# Patient Record
Sex: FEMALE | Race: OTHER | Employment: FULL TIME | URBAN - METROPOLITAN AREA
[De-identification: names, ages, dates, MRNs, and addresses within clinical notes are randomized per-mention and may not be internally consistent; named-entity substitution may affect disease eponyms.]

---

## 2022-10-08 ENCOUNTER — HOSPITAL ENCOUNTER (OUTPATIENT)
Age: 22
Discharge: HOME | End: 2022-10-08
Payer: MEDICAID

## 2022-10-08 VITALS
RESPIRATION RATE: 18 BRPM | HEIGHT: 59 IN | TEMPERATURE: 99 F | SYSTOLIC BLOOD PRESSURE: 126 MMHG | HEART RATE: 94 BPM | WEIGHT: 130 LBS | DIASTOLIC BLOOD PRESSURE: 67 MMHG | OXYGEN SATURATION: 98 % | BODY MASS INDEX: 26.21 KG/M2

## 2022-10-08 DIAGNOSIS — J01.90 ACUTE NON-RECURRENT SINUSITIS, UNSPECIFIED LOCATION: Primary | ICD-10-CM

## 2022-10-08 LAB
S PYO AG THROAT QL: NEGATIVE
SARS-COV-2 RNA RESP QL NAA+PROBE: NOT DETECTED

## 2022-10-08 PROCEDURE — 99204 OFFICE O/P NEW MOD 45 MIN: CPT

## 2022-10-08 PROCEDURE — 87880 STREP A ASSAY W/OPTIC: CPT

## 2022-10-08 PROCEDURE — 99203 OFFICE O/P NEW LOW 30 MIN: CPT

## 2022-10-08 RX ORDER — AMOXICILLIN AND CLAVULANATE POTASSIUM 875; 125 MG/1; MG/1
1 TABLET, FILM COATED ORAL 2 TIMES DAILY
Qty: 20 TABLET | Refills: 0 | Status: SHIPPED | OUTPATIENT
Start: 2022-10-08 | End: 2022-10-18

## 2022-10-08 NOTE — ED INITIAL ASSESSMENT (HPI)
cough, runny nose with green secretions, constant headache, started last Sunday. Low grade temps today.

## 2023-06-08 ENCOUNTER — HOSPITAL ENCOUNTER (OUTPATIENT)
Age: 23
Discharge: HOME | End: 2023-06-08
Payer: MEDICAID

## 2023-06-08 VITALS
OXYGEN SATURATION: 97 % | DIASTOLIC BLOOD PRESSURE: 80 MMHG | HEART RATE: 90 BPM | SYSTOLIC BLOOD PRESSURE: 119 MMHG | BODY MASS INDEX: 27 KG/M2 | TEMPERATURE: 99 F | RESPIRATION RATE: 15 BRPM | WEIGHT: 135 LBS

## 2023-06-08 DIAGNOSIS — R10.2 PELVIC CRAMPING: Primary | ICD-10-CM

## 2023-06-08 DIAGNOSIS — N92.6 MISSED MENSES: ICD-10-CM

## 2023-06-08 LAB
B-HCG SERPL-ACNC: <1 MIU/ML
B-HCG UR QL: NEGATIVE
POCT BILIRUBIN URINE: NEGATIVE
POCT GLUCOSE URINE: NEGATIVE MG/DL
POCT KETONE URINE: NEGATIVE MG/DL
POCT LEUKOCYTE ESTERASE URINE: NEGATIVE
POCT NITRITE URINE: NEGATIVE
POCT PH URINE: 6 (ref 5–8)
POCT PROTEIN URINE: NEGATIVE MG/DL
POCT SPECIFIC GRAVITY URINE: 1.02
POCT URINE CLARITY: CLEAR
POCT URINE COLOR: YELLOW
POCT UROBILINOGEN URINE: 0.2 MG/DL

## 2023-06-08 PROCEDURE — 99214 OFFICE O/P EST MOD 30 MIN: CPT

## 2023-06-08 PROCEDURE — 87591 N.GONORRHOEAE DNA AMP PROB: CPT | Performed by: PHYSICIAN ASSISTANT

## 2023-06-08 PROCEDURE — 87480 CANDIDA DNA DIR PROBE: CPT | Performed by: PHYSICIAN ASSISTANT

## 2023-06-08 PROCEDURE — 84702 CHORIONIC GONADOTROPIN TEST: CPT | Performed by: PHYSICIAN ASSISTANT

## 2023-06-08 PROCEDURE — 87086 URINE CULTURE/COLONY COUNT: CPT | Performed by: PHYSICIAN ASSISTANT

## 2023-06-08 PROCEDURE — 87077 CULTURE AEROBIC IDENTIFY: CPT | Performed by: PHYSICIAN ASSISTANT

## 2023-06-08 PROCEDURE — 36415 COLL VENOUS BLD VENIPUNCTURE: CPT

## 2023-06-08 PROCEDURE — 81025 URINE PREGNANCY TEST: CPT

## 2023-06-08 PROCEDURE — 87491 CHLMYD TRACH DNA AMP PROBE: CPT | Performed by: PHYSICIAN ASSISTANT

## 2023-06-08 PROCEDURE — 87510 GARDNER VAG DNA DIR PROBE: CPT | Performed by: PHYSICIAN ASSISTANT

## 2023-06-08 PROCEDURE — 81002 URINALYSIS NONAUTO W/O SCOPE: CPT | Performed by: EMERGENCY MEDICINE

## 2023-06-08 PROCEDURE — 87660 TRICHOMONAS VAGIN DIR PROBE: CPT | Performed by: PHYSICIAN ASSISTANT

## 2023-06-08 NOTE — ED INITIAL ASSESSMENT (HPI)
Pt 10 days late for period - cramping/sore breasts; negative home pregnancy tests; wants std testing    Denies fever/vom/vag bleeding/abd pain/hematuria/vag discharge/dysuria

## 2023-06-09 LAB
C TRACH DNA SPEC QL NAA+PROBE: NEGATIVE
N GONORRHOEA DNA SPEC QL NAA+PROBE: NEGATIVE

## 2023-06-09 RX ORDER — METRONIDAZOLE 500 MG/1
500 TABLET ORAL 3 TIMES DAILY
Qty: 30 TABLET | Refills: 0 | Status: SHIPPED | OUTPATIENT
Start: 2023-06-09 | End: 2023-06-19

## 2023-06-09 NOTE — ED NOTES
Called patient and alerted to lab results and Rx for flagyl. Discussed refraining from alcohol and sex until course completed. Patient verbalized understanding.

## 2023-06-09 NOTE — DISCHARGE INSTRUCTIONS
Your blood hCG level is currently pending along with gonorrhea chlamydia and vaginitis screening we will treat according to those results. If persistent symptoms and still not getting.   You should follow-up with OB/GYN call tomorrow to schedule an appointment as they usually take a long time to get into

## 2023-06-10 RX ORDER — NITROFURANTOIN 25; 75 MG/1; MG/1
100 CAPSULE ORAL 2 TIMES DAILY
Qty: 14 CAPSULE | Refills: 0 | Status: SHIPPED | OUTPATIENT
Start: 2023-06-10 | End: 2023-06-17

## 2023-06-10 NOTE — ED NOTES
I attempted to call pt with final urine results and need to begin Macrobid, but Mailbox is full, and there was no answer

## 2023-06-11 NOTE — ED NOTES
Pt called and informed of positive urine and need to start [address]. Pt states she saw in My chatrt and already picked up the prescription and began with treatment.    Will take in tandem with probiotic

## 2023-07-31 ENCOUNTER — HOSPITAL ENCOUNTER (OUTPATIENT)
Age: 23
Discharge: HOME | End: 2023-07-31
Payer: MEDICAID

## 2023-07-31 VITALS
SYSTOLIC BLOOD PRESSURE: 114 MMHG | HEIGHT: 59 IN | OXYGEN SATURATION: 98 % | WEIGHT: 135 LBS | DIASTOLIC BLOOD PRESSURE: 79 MMHG | TEMPERATURE: 98 F | HEART RATE: 74 BPM | RESPIRATION RATE: 16 BRPM | BODY MASS INDEX: 27.21 KG/M2

## 2023-07-31 DIAGNOSIS — N76.0 ACUTE VAGINITIS: Primary | ICD-10-CM

## 2023-07-31 LAB
B-HCG UR QL: NEGATIVE
POCT BILIRUBIN URINE: NEGATIVE
POCT GLUCOSE URINE: NEGATIVE MG/DL
POCT KETONE URINE: NEGATIVE MG/DL
POCT LEUKOCYTE ESTERASE URINE: NEGATIVE
POCT NITRITE URINE: NEGATIVE
POCT PH URINE: 5.5 (ref 5–8)
POCT PROTEIN URINE: NEGATIVE MG/DL
POCT SPECIFIC GRAVITY URINE: 1.03
POCT URINE CLARITY: CLEAR
POCT URINE COLOR: YELLOW
POCT UROBILINOGEN URINE: 0.2 MG/DL

## 2023-07-31 PROCEDURE — 99214 OFFICE O/P EST MOD 30 MIN: CPT

## 2023-07-31 PROCEDURE — 87480 CANDIDA DNA DIR PROBE: CPT | Performed by: PHYSICIAN ASSISTANT

## 2023-07-31 PROCEDURE — 87591 N.GONORRHOEAE DNA AMP PROB: CPT | Performed by: PHYSICIAN ASSISTANT

## 2023-07-31 PROCEDURE — 87510 GARDNER VAG DNA DIR PROBE: CPT | Performed by: PHYSICIAN ASSISTANT

## 2023-07-31 PROCEDURE — 87491 CHLMYD TRACH DNA AMP PROBE: CPT | Performed by: PHYSICIAN ASSISTANT

## 2023-07-31 PROCEDURE — 87660 TRICHOMONAS VAGIN DIR PROBE: CPT | Performed by: PHYSICIAN ASSISTANT

## 2023-07-31 PROCEDURE — 81002 URINALYSIS NONAUTO W/O SCOPE: CPT | Performed by: PHYSICIAN ASSISTANT

## 2023-07-31 PROCEDURE — 81025 URINE PREGNANCY TEST: CPT

## 2023-07-31 RX ORDER — METRONIDAZOLE 7.5 MG/G
1 GEL VAGINAL NIGHTLY
Qty: 70 G | Refills: 0 | Status: SHIPPED | OUTPATIENT
Start: 2023-07-31

## 2023-07-31 NOTE — ED INITIAL ASSESSMENT (HPI)
Patient states about 1 week ago she had intercourse and the condom broke and she took plan B. States 3 days ago she started with orange discharge, vaginal itching and discomfort. Denies any hematuria, dysuria, or other symptoms.

## 2023-08-01 LAB
C TRACH DNA SPEC QL NAA+PROBE: NEGATIVE
N GONORRHOEA DNA SPEC QL NAA+PROBE: NEGATIVE

## 2023-08-01 RX ORDER — FLUCONAZOLE 150 MG/1
150 TABLET ORAL ONCE
Qty: 1 TABLET | Refills: 0 | Status: SHIPPED | OUTPATIENT
Start: 2023-08-01 | End: 2023-08-01

## 2023-12-04 ENCOUNTER — APPOINTMENT (OUTPATIENT)
Dept: GENERAL RADIOLOGY | Age: 23
End: 2023-12-04
Attending: NURSE PRACTITIONER
Payer: MEDICAID

## 2023-12-04 ENCOUNTER — HOSPITAL ENCOUNTER (OUTPATIENT)
Age: 23
Discharge: HOME | End: 2023-12-04
Payer: MEDICAID

## 2023-12-04 VITALS
HEIGHT: 59 IN | WEIGHT: 150 LBS | BODY MASS INDEX: 30.24 KG/M2 | DIASTOLIC BLOOD PRESSURE: 80 MMHG | HEART RATE: 79 BPM | SYSTOLIC BLOOD PRESSURE: 133 MMHG | TEMPERATURE: 97 F | RESPIRATION RATE: 18 BRPM

## 2023-12-04 DIAGNOSIS — S93.402A MILD SPRAIN OF LEFT ANKLE, INITIAL ENCOUNTER: Primary | ICD-10-CM

## 2023-12-04 PROCEDURE — 99214 OFFICE O/P EST MOD 30 MIN: CPT

## 2023-12-04 PROCEDURE — 99213 OFFICE O/P EST LOW 20 MIN: CPT

## 2023-12-04 PROCEDURE — 73610 X-RAY EXAM OF ANKLE: CPT | Performed by: NURSE PRACTITIONER

## 2023-12-04 PROCEDURE — 73630 X-RAY EXAM OF FOOT: CPT | Performed by: NURSE PRACTITIONER

## 2024-02-26 ENCOUNTER — HOSPITAL ENCOUNTER (OUTPATIENT)
Age: 24
Discharge: HOME | End: 2024-02-26
Payer: MEDICAID

## 2024-02-26 VITALS
HEART RATE: 66 BPM | OXYGEN SATURATION: 99 % | BODY MASS INDEX: 31.25 KG/M2 | HEIGHT: 59 IN | WEIGHT: 155 LBS | TEMPERATURE: 98 F | SYSTOLIC BLOOD PRESSURE: 108 MMHG | DIASTOLIC BLOOD PRESSURE: 75 MMHG | RESPIRATION RATE: 18 BRPM

## 2024-02-26 DIAGNOSIS — R30.0 DYSURIA: Primary | ICD-10-CM

## 2024-02-26 DIAGNOSIS — N89.8 VAGINAL IRRITATION: ICD-10-CM

## 2024-02-26 LAB
B-HCG UR QL: NEGATIVE
BILIRUB UR QL STRIP: NEGATIVE
CLARITY UR: CLEAR
COLOR UR: YELLOW
GLUCOSE UR STRIP-MCNC: NEGATIVE MG/DL
HGB UR QL STRIP: NEGATIVE
KETONES UR STRIP-MCNC: NEGATIVE MG/DL
LEUKOCYTE ESTERASE UR QL STRIP: NEGATIVE
NITRITE UR QL STRIP: NEGATIVE
PH UR STRIP: 7 [PH]
PROT UR STRIP-MCNC: NEGATIVE MG/DL
SP GR UR STRIP: 1.02
UROBILINOGEN UR STRIP-ACNC: <2 MG/DL

## 2024-02-26 PROCEDURE — 81514 NFCT DS BV&VAGINITIS DNA ALG: CPT | Performed by: NURSE PRACTITIONER

## 2024-02-26 PROCEDURE — 87591 N.GONORRHOEAE DNA AMP PROB: CPT | Performed by: NURSE PRACTITIONER

## 2024-02-26 PROCEDURE — 81002 URINALYSIS NONAUTO W/O SCOPE: CPT

## 2024-02-26 PROCEDURE — 81025 URINE PREGNANCY TEST: CPT

## 2024-02-26 PROCEDURE — 99214 OFFICE O/P EST MOD 30 MIN: CPT

## 2024-02-26 PROCEDURE — 87491 CHLMYD TRACH DNA AMP PROBE: CPT | Performed by: NURSE PRACTITIONER

## 2024-02-26 RX ORDER — METRONIDAZOLE 7.5 MG/G
1 GEL VAGINAL NIGHTLY
Qty: 70 G | Refills: 0 | Status: SHIPPED | OUTPATIENT
Start: 2024-02-26 | End: 2024-03-02

## 2024-02-26 NOTE — ED INITIAL ASSESSMENT (HPI)
C/o 3 days -pain when urinating, pressure, and foul odor urine. Random vaginal itching. No vaginal discharges.

## 2024-02-26 NOTE — ED PROVIDER NOTES
Patient Seen in: [facility]      History     Chief Complaint   Patient presents with    Urinary Symptoms     UTI symptoms including pain when urinating, itchiness sometimes, pressure, and foul odor urine - Entered by patient     Stated Complaint: Urinary Symptoms - UTI symptoms including pain when urinating, itchiness someti*    Subjective:   23-year-old female presents to immediate care for dysuria.  Patient states that she has had some vaginal irritation along with pain and burning on urination.  Denies any abdominal pain            Objective:   History reviewed. No pertinent past medical history.           History reviewed. No pertinent surgical history.             Social History     Socioeconomic History    Marital status: Single   Tobacco Use    Smoking status: Never    Smokeless tobacco: Never   Vaping Use    Vaping Use: Never used   Substance and Sexual Activity    Alcohol use: Yes     Comment: social    Drug use: Never              Review of Systems   Constitutional: Negative.    Respiratory: Negative.     Cardiovascular: Negative.    Gastrointestinal: Negative.    Skin: Negative.    Neurological: Negative.        Positive for stated complaint: Urinary Symptoms - UTI symptoms including pain when urinating, itchiness someti*  Other systems are as noted in HPI.  Constitutional and vital signs reviewed.      All other systems reviewed and negative except as noted above.    Physical Exam     ED Triage Vitals [02/26/24 1323]   /75   Pulse 66   Resp 18   Temp 98 °F (36.7 °C)   Temp src Temporal   SpO2 99 %   O2 Device None (Room air)       Current:/75   Pulse 66   Temp 98 °F (36.7 °C) (Temporal)   Resp 18   Ht 149.9 cm (4' 11\")   Wt 70.3 kg   LMP 02/04/2024 (Approximate)   SpO2 99%   BMI 31.31 kg/m²         Physical Exam  Vitals and nursing note reviewed.   Constitutional:       General: She is not in acute distress.  HENT:      Head: Normocephalic.   Cardiovascular:       Rate and Rhythm: Normal rate and regular rhythm.   Pulmonary:      Effort: Pulmonary effort is normal.   Genitourinary:     General: Normal vulva.      Vagina: Vaginal discharge present.   Musculoskeletal:         General: Normal range of motion.   Skin:     General: Skin is warm and dry.   Neurological:      General: No focal deficit present.      Mental Status: She is alert and oriented to person, place, and time.               ED Course     Labs Reviewed   POCT PREGNANCY URINE - Normal   [facility] POCT URINALYSIS DIPSTICK   CHLAMYDIA/GONOCOCCUS, STEPHEN   VAGINITIS VAGINOSIS PCR PANEL                      MDM      Medical Decision Making  Pertinent Labs & Imaging studies reviewed. (See chart for details).  Patient coming in with dysuria, vaginal irritation.   Differential diagnosis includes UTI, BV, STI  Labs reviewed urine dip does not reveal acute findings.  Will treat for vaginal irritation likely bacterial vaginosis.  Will discharge on metronidazole. Patient is comfortable with this plan.     Overall Pt looks good. Non-toxic, well-hydrated and in no respiratory distress. Vital signs are reassuring. Exam is reassuring. I do not believe pt requires and additional diagnostic studies or intervention. I believe pt can be discharged home to continue evaluation as an outpatient. Follow-up provider given. Discharge instructions given and reviewed. Return for any problems. All understand and agreewith the plan.     Please note that this report has been produced using speech recognition software and may contain errors related to that system including, but not limited to, errors in grammar, punctuation, and spelling, as well as words and phrases that possibly may have been recognized inappropriately. If there are any questions or concerns, contact the dictating provider for clarification.       Problems Addressed:  Dysuria: acute illness or injury  Vaginal irritation: acute illness or injury    Amount and/or Complexity of Data  Reviewed  Labs: ordered. Decision-making details documented in ED Course.    Risk  OTC drugs.  Prescription drug management.        Disposition and Plan     Clinical Impression:  1. Dysuria    2. Vaginal irritation         Disposition:  Discharge  2/26/2024  2:07 pm    Follow-up:  No follow-up provider specified.        Medications Prescribed:  Discharge Medication List as of 2/26/2024  2:18 PM        START taking these medications    Details   metroNIDAZOLE 0.75 % Vaginal Gel Place 1 Application vaginally nightly for 5 days., Print, Disp-70 g, R-0

## 2024-02-27 LAB
BV BACTERIA DNA VAG QL NAA+PROBE: NEGATIVE
C GLABRATA DNA VAG QL NAA+PROBE: NEGATIVE
C KRUSEI DNA VAG QL NAA+PROBE: NEGATIVE
C TRACH DNA SPEC QL NAA+PROBE: NEGATIVE
CANDIDA DNA VAG QL NAA+PROBE: POSITIVE
N GONORRHOEA DNA SPEC QL NAA+PROBE: NEGATIVE
T VAGINALIS DNA VAG QL NAA+PROBE: NEGATIVE

## 2024-02-27 RX ORDER — FLUCONAZOLE 150 MG/1
150 TABLET ORAL ONCE
Qty: 1 TABLET | Refills: 0 | Status: SHIPPED | OUTPATIENT
Start: 2024-02-27 | End: 2024-02-27

## 2024-03-02 ENCOUNTER — HOSPITAL ENCOUNTER (OUTPATIENT)
Age: 24
Discharge: HOME | End: 2024-03-02
Payer: MEDICAID

## 2024-03-02 VITALS
BODY MASS INDEX: 31 KG/M2 | TEMPERATURE: 99 F | HEART RATE: 80 BPM | OXYGEN SATURATION: 97 % | RESPIRATION RATE: 20 BRPM | WEIGHT: 152 LBS | SYSTOLIC BLOOD PRESSURE: 113 MMHG | DIASTOLIC BLOOD PRESSURE: 78 MMHG

## 2024-03-02 DIAGNOSIS — R30.0 DYSURIA: Primary | ICD-10-CM

## 2024-03-02 LAB
B-HCG UR QL: NEGATIVE
BILIRUB UR QL STRIP: NEGATIVE
CLARITY UR: CLEAR
COLOR UR: YELLOW
GLUCOSE UR STRIP-MCNC: NEGATIVE MG/DL
KETONES UR STRIP-MCNC: NEGATIVE MG/DL
NITRITE UR QL STRIP: NEGATIVE
PH UR STRIP: 6 [PH]
PROT UR STRIP-MCNC: 30 MG/DL
SP GR UR STRIP: >=1.03
UROBILINOGEN UR STRIP-ACNC: <2 MG/DL

## 2024-03-02 PROCEDURE — 87086 URINE CULTURE/COLONY COUNT: CPT | Performed by: NURSE PRACTITIONER

## 2024-03-02 PROCEDURE — 81025 URINE PREGNANCY TEST: CPT

## 2024-03-02 PROCEDURE — 99214 OFFICE O/P EST MOD 30 MIN: CPT

## 2024-03-02 PROCEDURE — 81002 URINALYSIS NONAUTO W/O SCOPE: CPT

## 2024-03-02 RX ORDER — CEFUROXIME AXETIL 250 MG/1
250 TABLET ORAL 2 TIMES DAILY
Qty: 10 TABLET | Refills: 0 | Status: SHIPPED | OUTPATIENT
Start: 2024-03-02 | End: 2024-03-07

## 2024-03-02 RX ORDER — PHENAZOPYRIDINE HYDROCHLORIDE 200 MG/1
200 TABLET, FILM COATED ORAL 3 TIMES DAILY PRN
Qty: 6 TABLET | Refills: 0 | Status: SHIPPED | OUTPATIENT
Start: 2024-03-02 | End: 2024-03-09

## 2024-03-02 NOTE — DISCHARGE INSTRUCTIONS
Take antibiotic as directed.   For female patients: Whenever taking antibiotics, hormonal contraceptive medications may be less effective; you should therefore use barrier protection or some other form of secondary contraception while taking the antibiotic and for at least one week after the antibiotic is finished.   Pyridium / Phenazopyridine (bladder anti-spasmotic) for bladder spasm. This will turn your urine bright orange. (This medicine may also stain contact lens).   Drink enough water and fluids to keep your urine clear or pale yellow.   Avoid caffeine, tea, and carbonated beverages. They tend to irritate your bladder.   Empty your bladder often. Avoid holding urine for long periods of time.   Empty your bladder before and after sexual intercourse.   Women should cleanse from front to back. Use each tissue only once.     SEEK MEDICAL CARE IF:   You have back pain or abdominal pain   You develop a fever/ chills, nausea, vomiting   Your symptoms do not begin to resolve within a few days   We have sent a culture of your urine and will call you with the results

## 2024-03-02 NOTE — ED PROVIDER NOTES
Patient Seen in: [facility]      History     Chief Complaint   Patient presents with    Urinary Symptoms     Reoccurring yeast infection symptoms that did not get better after the first dose of antibiotics. - Entered by patient     Stated Complaint: Urinary Symptoms - Reoccurring yeast infection symptoms that did not get better*    Subjective:   HPI  Patient is a 23-year-old female who presents with foul-smelling urine, urinary urgency, dysuria.  Symptoms started over the last week.  She states that she is having normal menses and her next menses is due next week.  Lower abdominal intermittent cramping.  History of ovarian cyst.  Denies history of abdominal surgery.  She states that she was told recently that she is prediabetic.  Patient was seen in our facility on February 26, 2024 with complaint of vaginal irritation.  Vaginitis was positive for vaginal candidiasis.  Patient was treated with 1 tablet of Diflucan with resolution of those symptoms.  She had also been treated with metronidazole for presumed BV which ended up being negative.  Objective:   History reviewed. No pertinent past medical history.           History reviewed. No pertinent surgical history.             Social History     Socioeconomic History    Marital status: Single   Tobacco Use    Smoking status: Never    Smokeless tobacco: Never   Vaping Use    Vaping Use: Never used   Substance and Sexual Activity    Alcohol use: Yes     Comment: social    Drug use: Never              Review of Systems    Positive for stated complaint: Urinary Symptoms - Reoccurring yeast infection symptoms that did not get better*  Other systems are as noted in HPI.  Constitutional and vital signs reviewed.      All other systems reviewed and negative except as noted above.    Physical Exam     ED Triage Vitals [03/02/24 1309]   /78   Pulse 80   Resp 20   Temp 99.1 °F (37.3 °C)   Temp src Temporal   SpO2 97 %   O2 Device None (Room air)        Current:/78   Pulse 80   Temp 99.1 °F (37.3 °C) (Temporal)   Resp 20   Wt 68.9 kg   LMP 02/04/2024 (Approximate)   SpO2 97%   BMI 30.70 kg/m²         Physical Exam  Vitals and nursing note reviewed.   Constitutional:       General: She is not in acute distress.     Appearance: Normal appearance. She is well-developed. She is not ill-appearing, toxic-appearing or diaphoretic.   HENT:      Mouth/Throat:      Mouth: Mucous membranes are moist.   Eyes:      Conjunctiva/sclera: Conjunctivae normal.   Cardiovascular:      Rate and Rhythm: Normal rate and regular rhythm.      Pulses: Normal pulses.      Heart sounds: Normal heart sounds. No murmur heard.     No friction rub. No gallop.   Pulmonary:      Effort: Pulmonary effort is normal. No respiratory distress.      Breath sounds: Normal breath sounds. No stridor. No wheezing, rhonchi or rales.   Chest:      Chest wall: No tenderness.   Abdominal:      General: Bowel sounds are normal. There is no distension.      Palpations: Abdomen is soft. There is no mass.      Tenderness: There is no abdominal tenderness. There is no right CVA tenderness, left CVA tenderness, guarding or rebound.      Hernia: No hernia is present.   Skin:     General: Skin is warm and dry.      Capillary Refill: Capillary refill takes less than 2 seconds.      Coloration: Skin is not pale.      Findings: No erythema or rash.   Neurological:      Mental Status: She is alert and oriented to person, place, and time.   Psychiatric:         Mood and Affect: Mood normal.         Behavior: Behavior normal.                 ED Course     Labs Reviewed   [facility] POCT URINALYSIS DIPSTICK - Abnormal; Notable for the following components:       Result Value    Protein urine 30 (*)     Blood, Urine Large (*)     Leukocyte esterase urine Small (*)     All other components within normal limits   URINE CULTURE, ROUTINE                        MDM     23-year-old female presents with dysuria, urinary  frequency, foul-smelling urine which has been ongoing over the past week.  Patient was treated at our facility last week for vaginal candidiasis.  Differential; UTI, STI, pyelonephritis  Urinalysis dipstick; small leukocyte esterase, large blood, 30 protein.  Urine culture result is pending.  Urine hCG negative.  Results discussed.  No CVA tenderness.  Vital signs are stable and she is well-appearing.  Likely UTI.  Will treat for presumed UTI with cefuroxime.  Pyridium prescription also provided.  Otherwise supportive care.  Patient is aware that urine culture will be resulted within a few days and she will be notified of the result if we need to alter her treatment regimen.  She is to seek immediate medical attention for worsening symptoms.  Patient is agreeable with this plan.                                   Medical Decision Making      Disposition and Plan     Clinical Impression:  1. Dysuria         Disposition:  Discharge  3/2/2024  1:53 pm    Follow-up:  Isabel Turner DO  [address] [place] [id number]  [phone]      if symptoms persist or worsen.  Primary care provider referral if needed.  Otherwise follow-up with your own PCP.          Medications Prescribed:  Current Discharge Medication List        START taking these medications    Details   phenazopyridine 200 MG Oral Tab Take 1 tablet (200 mg total) by mouth 3 (three) times daily as needed for Pain.  Qty: 6 tablet, Refills: 0      cefuroxime 250 MG Oral Tab Take 1 tablet (250 mg total) by mouth 2 (two) times daily for 5 days.  Qty: 10 tablet, Refills: 0

## 2024-03-02 NOTE — ED INITIAL ASSESSMENT (HPI)
Here last week for vaginal/urinary symptoms. Returns today for malodorous urine, increased urinary urgency with dysuria. Denies fevers or back pain.

## 2024-06-14 ENCOUNTER — HOSPITAL ENCOUNTER (OUTPATIENT)
Age: 24
Discharge: HOME | End: 2024-06-14

## 2024-06-14 VITALS
TEMPERATURE: 98 F | OXYGEN SATURATION: 98 % | HEART RATE: 71 BPM | DIASTOLIC BLOOD PRESSURE: 79 MMHG | SYSTOLIC BLOOD PRESSURE: 113 MMHG | RESPIRATION RATE: 16 BRPM

## 2024-06-14 DIAGNOSIS — N89.8 VAGINAL ODOR: ICD-10-CM

## 2024-06-14 DIAGNOSIS — R39.9 UTI SYMPTOMS: Primary | ICD-10-CM

## 2024-06-14 LAB
B-HCG UR QL: NEGATIVE
BILIRUB UR QL STRIP: NEGATIVE
COLOR UR: YELLOW
GLUCOSE UR STRIP-MCNC: NEGATIVE MG/DL
KETONES UR STRIP-MCNC: NEGATIVE MG/DL
NITRITE UR QL STRIP: NEGATIVE
PH UR STRIP: 6 [PH]
PROT UR STRIP-MCNC: NEGATIVE MG/DL
SP GR UR STRIP: 1.02
UROBILINOGEN UR STRIP-ACNC: <2 MG/DL

## 2024-06-14 PROCEDURE — 87086 URINE CULTURE/COLONY COUNT: CPT | Performed by: NURSE PRACTITIONER

## 2024-06-14 PROCEDURE — 87088 URINE BACTERIA CULTURE: CPT | Performed by: NURSE PRACTITIONER

## 2024-06-14 PROCEDURE — 81025 URINE PREGNANCY TEST: CPT

## 2024-06-14 PROCEDURE — 81514 NFCT DS BV&VAGINITIS DNA ALG: CPT | Performed by: NURSE PRACTITIONER

## 2024-06-14 PROCEDURE — 99214 OFFICE O/P EST MOD 30 MIN: CPT

## 2024-06-14 PROCEDURE — 87186 SC STD MICRODIL/AGAR DIL: CPT | Performed by: NURSE PRACTITIONER

## 2024-06-14 PROCEDURE — 87591 N.GONORRHOEAE DNA AMP PROB: CPT | Performed by: NURSE PRACTITIONER

## 2024-06-14 PROCEDURE — 87491 CHLMYD TRACH DNA AMP PROBE: CPT | Performed by: NURSE PRACTITIONER

## 2024-06-14 PROCEDURE — 99459 PELVIC EXAMINATION: CPT

## 2024-06-14 PROCEDURE — 81002 URINALYSIS NONAUTO W/O SCOPE: CPT

## 2024-06-14 RX ORDER — NITROFURANTOIN 25; 75 MG/1; MG/1
100 CAPSULE ORAL 2 TIMES DAILY
Qty: 10 CAPSULE | Refills: 0 | Status: SHIPPED | OUTPATIENT
Start: 2024-06-14 | End: 2024-06-20

## 2024-06-14 NOTE — ED INITIAL ASSESSMENT (HPI)
Pt with vaginal odor x 3 days - started on leftover metrogel 3 days; urinary pressure since yesterday    No abnormal discharge

## 2024-06-14 NOTE — DISCHARGE INSTRUCTIONS
Please start the antibiotic as discussed. We will send a urine culture and advise you if a change is needed in your antibiotic.  Wipe from front to back after using the bathroom every time. Consider wet wipes for cleaning.  You may use a warm pack/heating pad to the back/belly if needed, warm water from the shower to the abdomen, no soaps or lotions to vaginal area.  Change your pad frequently during your menses.  If sexually active urinate before and after sexual intercourse and wipe front to back.  Stay well hydrated, wear cotton underwear to decrease the spread of E. Coli to the urethra.  Cranberry extract may help make bacteria less likely to live in the bladder. Consider supplements during illness or regularly if frequent infections occur.  Please follow up with your primary care provider if not improved despite treatment. Seek immediate care for worsening symptoms.

## 2024-06-15 PROBLEM — N93.9 ABNORMAL UTERINE BLEEDING: Status: ACTIVE | Noted: 2022-01-12

## 2024-06-15 PROBLEM — M25.512 SHOULDER PAIN, LEFT: Status: ACTIVE | Noted: 2021-11-01

## 2024-06-15 PROBLEM — E28.2 POLYCYSTIC OVARIES: Status: ACTIVE | Noted: 2022-01-19

## 2024-06-15 LAB
BV BACTERIA DNA VAG QL NAA+PROBE: NEGATIVE
C GLABRATA DNA VAG QL NAA+PROBE: NEGATIVE
C KRUSEI DNA VAG QL NAA+PROBE: NEGATIVE
CANDIDA DNA VAG QL NAA+PROBE: NEGATIVE
T VAGINALIS DNA VAG QL NAA+PROBE: NEGATIVE

## 2024-06-15 NOTE — ED PROVIDER NOTES
Patient Seen in: [facility]      History     Chief Complaint   Patient presents with    Eval-G     Stated Complaint: Sexually Transmitted Infection Screen    Subjective:   25 yo female presents with complaint of vaginal odor that began 3 days ago.   Also with urinary pressure, frequency x 2 days.   Pt began using a previous unused Metrogel prescription to treat her symptoms.   One sexual partner.    Denies change to vaginal discharge, fever, chills, abdominal pain, back pain, nausea, vomiting or diarrhea.     The history is provided by the patient.           Objective:   History reviewed. No pertinent past medical history.           History reviewed. No pertinent surgical history.             Social History     Socioeconomic History    Marital status: Single   Tobacco Use    Smoking status: Never    Smokeless tobacco: Never   Vaping Use    Vaping status: Never Used   Substance and Sexual Activity    Alcohol use: Yes     Comment: social    Drug use: Never     Social Determinants of Health     Financial Resource Strain: Not at Risk (2024)    Received from [facility]     Financial Resource Strain     Financial Resource Strain: 1   Food Insecurity: Not at Risk (2023)    Received from [facility]     Food Insecurity     Food: 1   Transportation Needs: Not at Risk (2023)    Received from [facility]     Transportation Needs     Transportation: 1   Physical Activity: Not on File (10/7/2022)    Received from [facility]     Physical Activity     Physical Activity: 0   Stress: Not on File (10/7/2022)    Received from [facility]     Stress     Stress: 0   Social Connections: Not on File (10/7/2022)    Received from [facility]     Social Connections     Social Connections and Isolation: 0   Housing Stability: Not at Risk (2023)    Received from [facility]     Housing Stability     Housin   Recent Concern: Housing Stability - At Risk (2023)    Received from  [facility], [facility]              Review of Systems   Constitutional:  Negative for chills and fever.   Genitourinary:  Positive for dysuria, frequency and urgency. Negative for hematuria.       Positive for stated complaint: Sexually Transmitted Infection Screen  Other systems are as noted in HPI.  Constitutional and vital signs reviewed.      All other systems reviewed and negative except as noted above.    Physical Exam     ED Triage Vitals [06/14/24 1017]   /79   Pulse 71   Resp 16   Temp 98.4 °F (36.9 °C)   Temp src Oral   SpO2 98 %   O2 Device None (Room air)       Current Vitals:   Vital Signs  BP: 113/79  Pulse: 71  Resp: 16  Temp: 98.4 °F (36.9 °C)  Temp src: Oral    Oxygen Therapy  SpO2: 98 %  O2 Device: None (Room air)            Physical Exam  Exam conducted with a chaperone present.   Constitutional:       Appearance: Normal appearance.   HENT:      Mouth/Throat:      Mouth: Mucous membranes are moist.   Eyes:      Conjunctiva/sclera: Conjunctivae normal.   Cardiovascular:      Rate and Rhythm: Normal rate and regular rhythm.      Heart sounds: Normal heart sounds.   Pulmonary:      Effort: Pulmonary effort is normal.      Breath sounds: Normal breath sounds.   Abdominal:      General: Abdomen is flat. Bowel sounds are normal.      Palpations: Abdomen is soft.      Tenderness: There is no abdominal tenderness.   Genitourinary:     General: Normal vulva.      Vagina: No vaginal discharge, erythema or tenderness.      Cervix: Normal.   Skin:     General: Skin is warm and dry.   Neurological:      Mental Status: She is alert.   Psychiatric:         Mood and Affect: Mood normal.               ED Course     Labs Reviewed   [facility] POCT URINALYSIS DIPSTICK - Abnormal; Notable for the following components:       Result Value    Urine Clarity Slightly cloudy (*)     Blood, Urine Trace-Intact (*)     Leukocyte esterase urine Small (*)     All other components within normal limits   POCT  PREGNANCY URINE - Normal   URINE CULTURE, ROUTINE   VAGINITIS VAGINOSIS PCR PANEL   CHLAMYDIA/GONOCOCCUS, STEPHEN                      MDM           Medical Decision Making  25 yo female presents with complaint of urinary frequency and vaginal odor x 3 days.     Differential diagnosis includes BV vs STI vs UTI     Overall, pt is well appearing, with normal vitals.  Exam is reassuring.   Pertinent Labs & Imaging studies reviewed. (See chart for details).  UA with trace leuks; given symptoms will treat with Macrobid.   Pt already using Metrogel for BV.    G/C, vaginosis panel sent. Will treat for yeast if indicated.   Other supportive care and discharge instructions given and reviewed.  Follow up for unresolved symptoms.   Pt understands and agrees with the plan.                              Please note that this report has been produced using speech recognition software and may contain errors related to that system including, but not limited to, errors in grammar, punctuation, and spelling, as well as words and phrases that possibly may have been recognized inappropriately. If there are any questions or concerns, contact the dictating provider for clarification. The 21st Century Cures Act makes Medical Notes like these available to patients in the interest of transparency.  However, be advised this is a medical document.  It is intended as peer to peer communication.  It is written in medical language and may contain abbreviations or verbiage that are unfamiliar.  It may appear blunt or direct.  Medical documents are intended to carry relevant information, facts as evident, and the clinical opinion of the practitioner       Amount and/or Complexity of Data Reviewed  External Data Reviewed: notes.  Labs: ordered.    Risk  OTC drugs.  Prescription drug management.        Disposition and Plan     Clinical Impression:  1. UTI symptoms    2. Vaginal odor         Disposition:  Discharge  6/14/2024 11:08  am    Follow-up:  pcp    Schedule an appointment as soon as possible for a visit   If symptoms worsen          Medications Prescribed:  Discharge Medication List as of 6/14/2024 11:24 AM        START taking these medications    Details   nitrofurantoin monohydrate macro 100 MG Oral Cap Take 1 capsule (100 mg total) by mouth 2 (two) times daily for 5 days., Normal, Disp-10 capsule, R-0

## 2024-06-17 LAB
C TRACH DNA SPEC QL NAA+PROBE: POSITIVE
N GONORRHOEA DNA SPEC QL NAA+PROBE: NEGATIVE

## 2024-06-17 RX ORDER — DOXYCYCLINE HYCLATE 100 MG/1
100 CAPSULE ORAL 2 TIMES DAILY
Qty: 14 CAPSULE | Refills: 0 | Status: SHIPPED | OUTPATIENT
Start: 2024-06-17 | End: 2024-06-24

## 2024-06-20 ENCOUNTER — HOSPITAL ENCOUNTER (OUTPATIENT)
Age: 24
Discharge: HOME | End: 2024-06-20

## 2024-06-20 VITALS
WEIGHT: 155 LBS | OXYGEN SATURATION: 97 % | RESPIRATION RATE: 18 BRPM | HEART RATE: 88 BPM | BODY MASS INDEX: 31.25 KG/M2 | SYSTOLIC BLOOD PRESSURE: 129 MMHG | DIASTOLIC BLOOD PRESSURE: 80 MMHG | HEIGHT: 59 IN | TEMPERATURE: 98 F

## 2024-06-20 DIAGNOSIS — B37.31 VAGINAL CANDIDIASIS: Primary | ICD-10-CM

## 2024-06-20 PROCEDURE — 87529 HSV DNA AMP PROBE: CPT | Performed by: NURSE PRACTITIONER

## 2024-06-20 PROCEDURE — 99214 OFFICE O/P EST MOD 30 MIN: CPT

## 2024-06-20 PROCEDURE — 99213 OFFICE O/P EST LOW 20 MIN: CPT

## 2024-06-20 RX ORDER — ERGOCALCIFEROL 1.25 MG/1
50000 CAPSULE ORAL WEEKLY
COMMUNITY
Start: 2024-05-07

## 2024-06-20 RX ORDER — FLUCONAZOLE 150 MG/1
150 TABLET ORAL
Qty: 2 TABLET | Refills: 0 | Status: SHIPPED | OUTPATIENT
Start: 2024-06-20 | End: 2024-06-24

## 2024-06-20 NOTE — DISCHARGE INSTRUCTIONS
Rest and drink plenty of fluids.   Start the Diflucan and make sure to take the second dose in 3 days.   Continue your antibiotics as prescribed.   Take a probiotic while on the antibiotics to help prevent further yeast infections.   We will call you if your swab is positive.   Follow up with your PCP in 1 week as needed.

## 2024-06-20 NOTE — ED INITIAL ASSESSMENT (HPI)
Patient states she completed her macrobid yesterday and has continued her doxycycline for the positive chlamydia. States this morning she started with itching and burning sensations to the vaginal area. States she does have some discomfort with urination. Denies any vaginal discharge, itching or rash to other parts of the body, or other symptoms.

## 2024-06-21 NOTE — ED PROVIDER NOTES
Patient Seen in: [facility]      History     Chief Complaint   Patient presents with    Gynecologic Exam     Entered by patient    Urinary Symptoms     Stated Complaint: Gynecologic Exam    Subjective:   25 yo female presents to the immediate care with c/o vaginal itching.  Patient states she finished a course of Macrobid yesterday and is still on doxycyline for chlamydia treatment.  She started having some vaginal itching and irritation this morning.  She states she has some burning with urination, but it is more external than internal.  She denies any fever, chills, vaginal discharge, vaginal pain, or vaginal bleeding.  She states her urinary symptoms have resolved after the Macrobid.     The history is provided by the patient.       Objective:   History reviewed. No pertinent past medical history.           History reviewed. No pertinent surgical history.             Social History     Socioeconomic History    Marital status: Single   Tobacco Use    Smoking status: Never    Smokeless tobacco: Never   Vaping Use    Vaping status: Never Used   Substance and Sexual Activity    Alcohol use: Yes     Comment: social    Drug use: Never     Social Determinants of Health     Financial Resource Strain: Not at Risk (4/2/2024)    Received from [facility]     Financial Resource Strain     Financial Resource Strain: 1   Food Insecurity: Not at Risk (11/13/2023)    [facility] from [facility]     Food Insecurity     Food: 1   Transportation Needs: Not at Risk (11/13/2023)    Received from [facility]     Transportation Needs     Transportation: 1   Physical Activity: Not on File (10/7/2022)    Received from [facility]     Physical Activity     Physical Activity: 0   Stress: Not on File (10/7/2022)    Received from [facility]     Stress     Stress: 0   Social Connections: Not on File (10/7/2022)    Received from [facility]     Social Connections     Social Connections and Isolation: 0   Housing Stability:  Not at Risk (2023)    Received from [facility]     Housing Stability     Housin   Recent Concern: Housing Stability - At Risk (2023)    [facility]              Review of Systems   Constitutional: Negative.  Negative for chills and fever.   Genitourinary:  Negative for dysuria, frequency, pelvic pain, urgency, vaginal bleeding, vaginal discharge and vaginal pain.        Vaginal itching.    All other systems reviewed and are negative.      Positive for stated complaint: Gynecologic Exam  Other systems are as noted in HPI.  Constitutional and vital signs reviewed.      All other systems reviewed and negative except as noted above.    Physical Exam     ED Triage Vitals [24]   /80   Pulse 88   Resp 18   Temp 97.9 °F (36.6 °C)   Temp src Temporal   SpO2 97 %   O2 Device None (Room air)       Current Vitals:   Vital Signs  BP: 129/80  Pulse: 88  Resp: 18  Temp: 97.9 °F (36.6 °C)  Temp src: Temporal    Oxygen Therapy  SpO2: 97 %  O2 Device: None (Room air)            Physical Exam  Vitals and nursing note reviewed. Exam conducted with a chaperone present.   Constitutional:       General: She is not in acute distress.     Appearance: Normal appearance. She is normal weight. She is not ill-appearing.   HENT:      Head: Normocephalic and atraumatic.      Mouth/Throat:      Mouth: Mucous membranes are moist.      Pharynx: Oropharynx is clear.   Eyes:      Conjunctiva/sclera: Conjunctivae normal.      Pupils: Pupils are equal, round, and reactive to light.   Cardiovascular:      Rate and Rhythm: Normal rate and regular rhythm.      Pulses: Normal pulses.      Heart sounds: Normal heart sounds.   Pulmonary:      Effort: Pulmonary effort is normal. No respiratory distress.      Breath sounds: Normal breath sounds.   Abdominal:      General: Abdomen is flat. Bowel sounds are normal.      Palpations: Abdomen is soft.      Tenderness: There is no  abdominal tenderness.   Genitourinary:     General: Normal vulva.      Exam position: Lithotomy position.      Comments: Vaginal vault is erythematous and excoriated.  There are vesicle-like lesions visualized to the distal vaginal vault.  Herpes swab collected and sent.  Exam chaperoned by CHRISTIANO Meneses.   Skin:     General: Skin is warm and dry.   Neurological:      General: No focal deficit present.      Mental Status: She is alert and oriented to person, place, and time.   Psychiatric:         Mood and Affect: Mood normal.         Behavior: Behavior normal.           ED Course     Labs Reviewed   HSV 1/2 SUBTYPE BY PCR (LESION-ONLY)                 MDM              Medical Decision Making  23 yo female with hx. And exam consistent with vaginal candidiasis.  Will hold treatment for herpes until swab is returned.  No evidence of sepsis, PID, bacterial vaginosis, gonorrhea, or UTI.  Patient aware that we will call her with positive results only.  F/u with PCP or OB/GYN as needed.     Amount and/or Complexity of Data Reviewed  Labs: ordered. Decision-making details documented in ED Course.    Risk  Prescription drug management.        Disposition and Plan     Clinical Impression:  1. Vaginal candidiasis         Disposition:  Discharge  6/20/2024  6:45 pm    Follow-up:  Vijay Cooper MD  [address] [address]  [phone]    In 1 week  As needed          Medications Prescribed:  Discharge Medication List as of 6/20/2024  6:47 PM        START taking these medications    Details   fluconazole 150 MG Oral Tab Take 1 tablet (150 mg total) by mouth every 3 (three) days for 2 doses., Normal, Disp-2 tablet, R-0

## 2024-06-26 LAB
HSV 1 NAA: NEGATIVE
HSV 1 NAA: NEGATIVE
HSV 2 NAA: NEGATIVE
HSV 2 NAA: NEGATIVE

## 2024-08-11 ENCOUNTER — HOSPITAL ENCOUNTER (OUTPATIENT)
Age: 24
Discharge: HOME | End: 2024-08-11
Attending: EMERGENCY MEDICINE
Payer: MEDICAID

## 2024-08-11 VITALS
TEMPERATURE: 99 F | DIASTOLIC BLOOD PRESSURE: 76 MMHG | BODY MASS INDEX: 30.84 KG/M2 | OXYGEN SATURATION: 98 % | HEIGHT: 59 IN | HEART RATE: 86 BPM | SYSTOLIC BLOOD PRESSURE: 123 MMHG | RESPIRATION RATE: 18 BRPM | WEIGHT: 153 LBS

## 2024-08-11 DIAGNOSIS — N30.00 ACUTE CYSTITIS WITHOUT HEMATURIA: Primary | ICD-10-CM

## 2024-08-11 LAB
B-HCG UR QL: NEGATIVE
BILIRUB UR QL STRIP: NEGATIVE
COLOR UR: YELLOW
GLUCOSE UR STRIP-MCNC: NEGATIVE MG/DL
HGB UR QL STRIP: NEGATIVE
KETONES UR STRIP-MCNC: NEGATIVE MG/DL
LEUKOCYTE ESTERASE UR QL STRIP: NEGATIVE
NITRITE UR QL STRIP: POSITIVE
PH UR STRIP: 6 [PH]
PROT UR STRIP-MCNC: NEGATIVE MG/DL
SP GR UR STRIP: >=1.03
UROBILINOGEN UR STRIP-ACNC: <2 MG/DL

## 2024-08-11 PROCEDURE — 81025 URINE PREGNANCY TEST: CPT

## 2024-08-11 PROCEDURE — 81002 URINALYSIS NONAUTO W/O SCOPE: CPT

## 2024-08-11 PROCEDURE — 99214 OFFICE O/P EST MOD 30 MIN: CPT

## 2024-08-11 PROCEDURE — 99213 OFFICE O/P EST LOW 20 MIN: CPT

## 2024-08-11 RX ORDER — SULFAMETHOXAZOLE AND TRIMETHOPRIM 800; 160 MG/1; MG/1
1 TABLET ORAL 2 TIMES DAILY
Qty: 6 TABLET | Refills: 0 | Status: SHIPPED | OUTPATIENT
Start: 2024-08-11 | End: 2024-08-14

## 2024-08-11 RX ORDER — PHENAZOPYRIDINE HYDROCHLORIDE 200 MG/1
200 TABLET, FILM COATED ORAL 3 TIMES DAILY PRN
Qty: 6 TABLET | Refills: 0 | Status: SHIPPED | OUTPATIENT
Start: 2024-08-11 | End: 2024-08-13

## 2024-08-16 NOTE — ED PROVIDER NOTES
Patient Seen in: [facility]      History     Chief Complaint   Patient presents with    Urinary Symptoms     UTI Symptoms such as dysuria and spotting - Entered by patient     Stated Complaint: Urinary Symptoms - UTI Symptoms such as dysuria and spotting    Subjective:   HPI    23 yo with dysuria and hematuria for a few days. No fever. No abdominal or back pain. No vomiting.     Objective:   History reviewed. No pertinent past medical history.           History reviewed. No pertinent surgical history.             No pertinent social history.            Review of Systems    Positive for stated Chief Complaint: Urinary Symptoms (UTI Symptoms such as dysuria and spotting - Entered by patient)    Other systems are as noted in HPI.  Constitutional and vital signs reviewed.      All other systems reviewed and negative except as noted above.    Physical Exam     ED Triage Vitals [08/11/24 1436]   /76   Pulse 86   Resp 18   Temp 98.6 °F (37 °C)   Temp src Temporal   SpO2 98 %   O2 Device None (Room air)       Current Vitals:   No data recorded        Physical Exam  Vitals and nursing note reviewed.   Constitutional:       Appearance: Normal appearance. She is well-developed.   HENT:      Head: Normocephalic and atraumatic.   Cardiovascular:      Rate and Rhythm: Normal rate and regular rhythm.   Pulmonary:      Effort: Pulmonary effort is normal. No respiratory distress.   Abdominal:      Palpations: Abdomen is soft.      Tenderness: There is no abdominal tenderness. There is no right CVA tenderness or left CVA tenderness.   Skin:     General: Skin is warm and dry.      Capillary Refill: Capillary refill takes less than 2 seconds.   Neurological:      General: No focal deficit present.      Mental Status: She is alert.      Sensory: No sensory deficit.   Psychiatric:         Mood and Affect: Mood normal.         Behavior: Behavior normal.              ED Course     Labs Reviewed   EM POCT URINALYSIS  DIPSTICK - Abnormal; Notable for the following components:       Result Value    Urine Clarity Slightly cloudy (*)     Nitrite Urine Positive (*)     All other components within normal limits   POCT PREGNANCY URINE - Normal                      MDM                                      Medical Decision Making  Bladder infection, ascending infection, interstitial cystitis all in differential. UA consistent with UTI. Discharge on bactrim and pyridium as prescribed    Disposition and Plan     Clinical Impression:  1. Acute cystitis without hematuria         Disposition:  Discharge  8/11/2024  2:56 pm    Follow-up:  Tamara Lester MD  [address] [place] [id number] [phone]      As needed          Medications Prescribed:  Discharge Medication List as of 8/11/2024  2:58 PM        START taking these medications    Details   sulfamethoxazole-trimethoprim -160 MG Oral Tab per tablet Take 1 tablet by mouth 2 (two) times daily for 3 days., Normal, Disp-6 tablet, R-0      phenazopyridine 200 MG Oral Tab Take 1 tablet (200 mg total) by mouth 3 (three) times daily as needed for Pain., Normal, Disp-6 tablet, R-0

## 2024-09-17 ENCOUNTER — HOSPITAL ENCOUNTER (OUTPATIENT)
Age: 24
Discharge: HOME | End: 2024-09-17
Attending: EMERGENCY MEDICINE
Payer: MEDICAID

## 2024-09-17 VITALS
DIASTOLIC BLOOD PRESSURE: 68 MMHG | WEIGHT: 150 LBS | HEIGHT: 59 IN | HEART RATE: 83 BPM | TEMPERATURE: 98 F | BODY MASS INDEX: 30.24 KG/M2 | SYSTOLIC BLOOD PRESSURE: 111 MMHG | RESPIRATION RATE: 16 BRPM | OXYGEN SATURATION: 98 %

## 2024-09-17 DIAGNOSIS — R10.2 PELVIC CRAMPING: ICD-10-CM

## 2024-09-17 DIAGNOSIS — R35.0 URINARY FREQUENCY: Primary | ICD-10-CM

## 2024-09-17 LAB
B-HCG UR QL: NEGATIVE
BILIRUB UR QL STRIP: NEGATIVE
CLARITY UR: CLEAR
COLOR UR: YELLOW
GLUCOSE UR STRIP-MCNC: NEGATIVE MG/DL
KETONES UR STRIP-MCNC: NEGATIVE MG/DL
LEUKOCYTE ESTERASE UR QL STRIP: NEGATIVE
NITRITE UR QL STRIP: NEGATIVE
PH UR STRIP: 6 [PH]
PROT UR STRIP-MCNC: NEGATIVE MG/DL
SP GR UR STRIP: 1.01
UROBILINOGEN UR STRIP-ACNC: <2 MG/DL

## 2024-09-17 PROCEDURE — 87591 N.GONORRHOEAE DNA AMP PROB: CPT | Performed by: EMERGENCY MEDICINE

## 2024-09-17 PROCEDURE — 99213 OFFICE O/P EST LOW 20 MIN: CPT

## 2024-09-17 PROCEDURE — 81025 URINE PREGNANCY TEST: CPT

## 2024-09-17 PROCEDURE — 87491 CHLMYD TRACH DNA AMP PROBE: CPT | Performed by: EMERGENCY MEDICINE

## 2024-09-17 PROCEDURE — 99214 OFFICE O/P EST MOD 30 MIN: CPT

## 2024-09-17 PROCEDURE — 87086 URINE CULTURE/COLONY COUNT: CPT | Performed by: EMERGENCY MEDICINE

## 2024-09-17 PROCEDURE — 81002 URINALYSIS NONAUTO W/O SCOPE: CPT

## 2024-09-17 NOTE — ED INITIAL ASSESSMENT (HPI)
Pt with increased urinary frequency, cramping and lower back pain. Pt states she had spotting last week, but is due for her period this week.

## 2024-09-17 NOTE — ED PROVIDER NOTES
Patient Seen in: [facility]      History     Chief Complaint   Patient presents with    Urinary Symptoms     UTI symptoms such as frequency, cramping, low back pain.STD Panel as well. - Entered by patient     Stated Complaint: Urinary Symptoms - UTI symptoms such as frequency, cramping, low back pain.    Subjective:   24-year-old female presents with urinary frequency.  Started about a week ago.  Patient states she also had some spotting last week after taking a Plan B.  Says she has a lot of pelvic cramping as well.  Last menstrual cycle was about 4 weeks ago.  She has 1 sexual partner.  No concern for STDs she states.  No discharge.  No malodorous discharge either.  No dysuria.  No flank pain.  No fevers or other complaints            Objective:   No pertinent past medical history.            No pertinent past surgical history.              No pertinent social history.            Review of Systems   Constitutional:  Negative for fever.   Respiratory:  Negative for shortness of breath.    Cardiovascular:  Negative for chest pain.   Gastrointestinal:  Positive for abdominal pain.   Genitourinary:  Negative for vaginal discharge and vaginal pain.   Musculoskeletal:  Negative for back pain.       Positive for stated Chief Complaint: Urinary Symptoms (UTI symptoms such as frequency, cramping, low back pain.  STD Panel as well. - Entered by patient)    Other systems are as noted in HPI.  Constitutional and vital signs reviewed.      All other systems reviewed and negative except as noted above.    Physical Exam     ED Triage Vitals [09/17/24 1708]   /68   Pulse 83   Resp 16   Temp 98.1 °F (36.7 °C)   Temp src Oral   SpO2 98 %   O2 Device None (Room air)       Current Vitals:   Vital Signs  BP: 111/68  Pulse: 83  Resp: 16  Temp: 98.1 °F (36.7 °C)  Temp src: Oral    Oxygen Therapy  SpO2: 98 %  O2 Device: None (Room air)            Physical Exam  Vitals and nursing note reviewed.   Constitutional:        General: She is not in acute distress.     Appearance: She is not toxic-appearing.   HENT:      Head: Normocephalic.   Cardiovascular:      Rate and Rhythm: Normal rate and regular rhythm.      Heart sounds: Normal heart sounds.   Pulmonary:      Effort: Pulmonary effort is normal. No respiratory distress.      Breath sounds: Normal breath sounds.   Abdominal:      General: There is no distension.      Palpations: Abdomen is soft.      Tenderness: There is no abdominal tenderness. There is no right CVA tenderness, left CVA tenderness, guarding or rebound.   Musculoskeletal:         General: Normal range of motion.   Skin:     General: Skin is warm and dry.   Neurological:      General: No focal deficit present.      Mental Status: She is alert.   Psychiatric:         Mood and Affect: Mood normal.         Behavior: Behavior normal.               ED Course     Labs Reviewed   [facility] POCT URINALYSIS DIPSTICK - Abnormal; Notable for the following components:       Result Value    Blood, Urine Trace-Intact (*)     All other components within normal limits   POCT PREGNANCY URINE - Normal   URINE CULTURE, ROUTINE   CHLAMYDIA/GONOCOCCUS, STEPHEN                      MDM       23-year-old female with some lower abdominal/pelvic cramping.  She has spotting last week after taking Plan B.  She is wondering if the mess of her hormonal cycle.  She is due for her menstrual cycle this upcoming week.  She has no discharge or concern for STDs she states.  Did offer consider and discussed pelvic examination.  Says she will follow-up with her OB/GYN.  Benign abdomen.  No flank pain.  Trace amount of blood in her urine she states is always the case for her.  Will send urine culture.  Pregnancy is negative.  Will send GC chlamydia as well as she would like to \"be checked for everything.\"  She had some recent testing as well.  That was in care everywhere.  She is well-appearing and nontoxic.  She would like to be discharged home with her  young son who is at bedside with her.  Discharge home, shared decision making utilized and return precaution provided                                   Medical Decision Making      Disposition and Plan     Clinical Impression:  1. Urinary frequency    2. Pelvic cramping         Disposition:  Discharge  9/17/2024  5:34 pm    Follow-up:  No follow-up provider specified.        Medications Prescribed:  Current Discharge Medication List

## 2024-09-18 LAB
C TRACH DNA SPEC QL NAA+PROBE: NEGATIVE
N GONORRHOEA DNA SPEC QL NAA+PROBE: NEGATIVE

## 2024-10-09 ENCOUNTER — HOSPITAL ENCOUNTER (OUTPATIENT)
Age: 24
Discharge: HOME | End: 2024-10-09
Attending: EMERGENCY MEDICINE
Payer: MEDICAID

## 2024-10-09 VITALS
WEIGHT: 150 LBS | HEART RATE: 81 BPM | TEMPERATURE: 98 F | OXYGEN SATURATION: 98 % | DIASTOLIC BLOOD PRESSURE: 73 MMHG | RESPIRATION RATE: 16 BRPM | HEIGHT: 59 IN | SYSTOLIC BLOOD PRESSURE: 119 MMHG | BODY MASS INDEX: 30.24 KG/M2

## 2024-10-09 DIAGNOSIS — J02.0 STREP PHARYNGITIS: Primary | ICD-10-CM

## 2024-10-09 LAB — S PYO AG THROAT QL IA.RAPID: POSITIVE

## 2024-10-09 PROCEDURE — 99213 OFFICE O/P EST LOW 20 MIN: CPT

## 2024-10-09 PROCEDURE — 87651 STREP A DNA AMP PROBE: CPT | Performed by: EMERGENCY MEDICINE

## 2024-10-09 RX ORDER — PENICILLIN V POTASSIUM 500 MG/1
500 TABLET, FILM COATED ORAL 2 TIMES DAILY
Qty: 20 TABLET | Refills: 0 | Status: SHIPPED | OUTPATIENT
Start: 2024-10-09 | End: 2024-10-19

## 2024-10-09 NOTE — ED INITIAL ASSESSMENT (HPI)
Pt states having sore throat since yesterday.   Also c/o headache.    Denies cough/congestion.    Denies fever.

## 2024-10-09 NOTE — ED PROVIDER NOTES
Patient Seen in: [facility]      History     Chief Complaint   Patient presents with    Sore Throat     Stated Complaint: Cold - Possible Strep (difficulty swallowing, headache, stuffy headache/ear braeden*    Subjective:   HPI      23 yo female with sore throat since yesterday. Some headache and ear pain. No fever. No URI.     Objective:     History reviewed. No pertinent past medical history.           History reviewed. No pertinent surgical history.             No pertinent social history.            Review of Systems    Positive for stated complaint: Cold - Possible Strep (difficulty swallowing, headache, stuffy headache/ear braeden*  Other systems are as noted in HPI.  Constitutional and vital signs reviewed.      All other systems reviewed and negative except as noted above.    Physical Exam     ED Triage Vitals [10/09/24 1147]   /73   Pulse 81   Resp 16   Temp 97.9 °F (36.6 °C)   Temp src Temporal   SpO2 98 %   O2 Device None (Room air)       Current Vitals:   Vital Signs  BP: 119/73  Pulse: 81  Resp: 16  Temp: 97.9 °F (36.6 °C)  Temp src: Temporal    Oxygen Therapy  SpO2: 98 %  O2 Device: None (Room air)        Physical Exam  Vitals and nursing note reviewed.   Constitutional:       Appearance: Normal appearance. She is well-developed.   HENT:      Head: Normocephalic and atraumatic.      Right Ear: Tympanic membrane normal.      Left Ear: Tympanic membrane normal.      Mouth/Throat:      Mouth: Mucous membranes are dry.      Pharynx: Posterior oropharyngeal erythema present. No oropharyngeal exudate.      Tonsils: No tonsillar exudate.   Cardiovascular:      Rate and Rhythm: Normal rate and regular rhythm.   Pulmonary:      Effort: Pulmonary effort is normal. No respiratory distress.   Lymphadenopathy:      Cervical: Cervical adenopathy present.   Skin:     General: Skin is warm and dry.      Capillary Refill: Capillary refill takes less than 2 seconds.   Neurological:      General: No focal  deficit present.      Mental Status: She is alert.      Sensory: No sensory deficit.   Psychiatric:         Mood and Affect: Mood normal.         Behavior: Behavior normal.            ED Course     Labs Reviewed   RAPID STREP A - Abnormal; Notable for the following components:       Result Value    Strep A by PCR Positive (*)     All other components within normal limits                   MDM             Medical Decision Making  Strep pharyngitis, viral illness both in differential. Rapid strep is positive. Discharge on penicillin. Otc ibuprofen for pain.     Disposition and Plan     Clinical Impression:  1. Strep pharyngitis         Disposition:  Discharge  10/9/2024 12:10 pm    Follow-up:  Regi Bowling MD  [address] [place] [id number] [phone]      As needed          Medications Prescribed:  Discharge Medication List as of 10/9/2024 12:12 PM        START taking these medications    Details   penicillin v potassium 500 MG Oral Tab Take 1 tablet (500 mg total) by mouth 2 (two) times daily for 10 days., Normal, Disp-20 tablet, R-0                 Supplementary Documentation:

## 2024-11-14 ENCOUNTER — HOSPITAL ENCOUNTER (OUTPATIENT)
Age: 24
Discharge: HOME | End: 2024-11-14
Payer: MEDICAID

## 2024-11-14 VITALS
TEMPERATURE: 98 F | SYSTOLIC BLOOD PRESSURE: 114 MMHG | RESPIRATION RATE: 16 BRPM | HEART RATE: 90 BPM | OXYGEN SATURATION: 98 % | DIASTOLIC BLOOD PRESSURE: 70 MMHG

## 2024-11-14 DIAGNOSIS — N89.8 VAGINAL SORE: Primary | ICD-10-CM

## 2024-11-14 LAB
B-HCG UR QL: NEGATIVE
BILIRUB UR QL STRIP: NEGATIVE
CLARITY UR: CLEAR
COLOR UR: YELLOW
GLUCOSE UR STRIP-MCNC: NEGATIVE MG/DL
KETONES UR STRIP-MCNC: NEGATIVE MG/DL
NITRITE UR QL STRIP: NEGATIVE
PH UR STRIP: 7 [PH]
PROT UR STRIP-MCNC: NEGATIVE MG/DL
SP GR UR STRIP: 1.02
UROBILINOGEN UR STRIP-ACNC: <2 MG/DL

## 2024-11-14 PROCEDURE — 99214 OFFICE O/P EST MOD 30 MIN: CPT

## 2024-11-14 PROCEDURE — 81002 URINALYSIS NONAUTO W/O SCOPE: CPT

## 2024-11-14 PROCEDURE — 81025 URINE PREGNANCY TEST: CPT

## 2024-11-14 PROCEDURE — 87529 HSV DNA AMP PROBE: CPT | Performed by: PHYSICIAN ASSISTANT

## 2024-11-14 RX ORDER — MUPIROCIN 20 MG/G
1 OINTMENT TOPICAL 3 TIMES DAILY
Qty: 1 EACH | Refills: 0 | Status: SHIPPED | OUTPATIENT
Start: 2024-11-14 | End: 2024-11-28

## 2024-11-14 RX ORDER — VALACYCLOVIR HYDROCHLORIDE 1 G/1
1000 TABLET, FILM COATED ORAL EVERY 12 HOURS
Qty: 20 TABLET | Refills: 0 | Status: SHIPPED | OUTPATIENT
Start: 2024-11-14 | End: 2024-11-24

## 2024-11-14 NOTE — DISCHARGE INSTRUCTIONS
Please return to the ER/clinic if symptoms worsen. Follow-up with your PCP in 24-48 hours as needed.    Apply mupirocin to the area.  Avoid any shaving etc.  If you note any other lesions start the Valtrex.  The culture will be sent however if there is not enough fluid it may be a false negative.  No sexual intercourse for the next several weeks until area is healed.  Follow-up with your gynecologist.

## 2024-11-14 NOTE — ED PROVIDER NOTES
Patient Seen in: [facility]      History     Chief Complaint   Patient presents with    Eval-G     Stated Complaint: Sexually Transmitted Infection Screen - STD testing    Subjective:   HPI      23 yo here with complaint of a sore in her vaginal area that is burning.  Patient was concerned about herpes however she did shave in the area couple days ago and may have cut herself.  Patient denies chest pain, shortness of breath, cough, abdominal pain, nausea, vomiting or diarrhea.  Patient denies dysuria, hematuria or flank pain.  Patient denies vaginal discharge etc.  Afebrile    Objective:     History reviewed. No pertinent past medical history.           History reviewed. No pertinent surgical history.             The patient's medication list, past medical history and social history elements  as listed in today's nurse's notes are reviewed and agree.   The patient's family history is reviewed and is noncontributory to the presenting problem, except as indicated as above.   Social History     Socioeconomic History    Marital status: Single   Tobacco Use    Smoking status: Never    Smokeless tobacco: Never   Vaping Use    Vaping status: Never Used   Substance and Sexual Activity    Alcohol use: Yes     Comment: social    Drug use: Never     Social Drivers of Health     Financial Resource Strain: Not at Risk (4/2/2024)    Received from [facility]    Financial Resource Strain     Financial Resource Strain: 1   Food Insecurity: Not at Risk (11/13/2023)    [facility] from [facility]    Food Insecurity     Food: 1   Transportation Needs: Not at Risk (11/13/2023)    Received from [facility]    Transportation Needs     Transportation: 1   Physical Activity: Not on File (10/7/2022)    Received from [facility]    Physical Activity     Physical Activity: 0   Stress: Not on File (10/7/2022)    Received from [facility]    Stress     Stress: 0   Social Connections: Not on File (9/9/2024)    Received from [facility]     Social Connections     Connectedness: 0   Housing Stability: Not at Risk (2023)    Received from [facility]    Housing Stability     Housin   Recent Concern: Housing Stability - At Risk (2023)    [facility]              Review of Systems    Positive for stated complaint: Sexually Transmitted Infection Screen - STD testing  Other systems are as noted in HPI.  Constitutional and vital signs reviewed.      All other systems reviewed and negative except as noted above.    Physical Exam     ED Triage Vitals [24 1145]   /70   Pulse 90   Resp 16   Temp 97.8 °F (36.6 °C)   Temp src Temporal   SpO2 98 %   O2 Device None (Room air)       Current Vitals:   Vital Signs  BP: 114/70  Pulse: 90  Resp: 16  Temp: 97.8 °F (36.6 °C)  Temp src: Temporal    Oxygen Therapy  SpO2: 98 %  O2 Device: None (Room air)        Physical Exam  Vitals and nursing note reviewed. Exam conducted with a chaperone present.   Constitutional:       Appearance: Normal appearance. She is well-developed.   HENT:      Head: Normocephalic.      Right Ear: External ear normal.      Left Ear: External ear normal.      Nose: Nose normal.      Mouth/Throat:      Mouth: Mucous membranes are moist.   Eyes:      Conjunctiva/sclera: Conjunctivae normal.      Pupils: Pupils are equal, round, and reactive to light.   Cardiovascular:      Rate and Rhythm: Normal rate and regular rhythm.      Heart sounds: Normal heart sounds.   Pulmonary:      Effort: Pulmonary effort is normal.      Breath sounds: Normal breath sounds.   Genitourinary:     Exam position: Knee-chest position.      Vagina: No vaginal discharge.          Comments: Approx 1x1 cm area of open dermis: area cultured  Musculoskeletal:      Cervical back: Normal range of motion and neck supple.   Skin:     General: Skin is warm.      Capillary Refill: Capillary refill takes less than 2 seconds.   Neurological:      General: No focal  deficit present.      Mental Status: She is alert and oriented to person, place, and time.   Psychiatric:         Mood and Affect: Mood normal.         Behavior: Behavior normal.         Thought Content: Thought content normal.         Judgment: Judgment normal.           ED Course     Labs Reviewed   [facility] POCT URINALYSIS DIPSTICK - Abnormal; Notable for the following components:       Result Value    Blood, Urine Trace-Intact (*)     Leukocyte esterase urine Trace (*)     All other components within normal limits   POCT PREGNANCY URINE - Normal     NOTE: Discussed the fact that there was no fluid left still the culture may come up negative.  It could be potentially cut from the razor or a ruptured vesicular lesion.  She is to apply mupirocin if more lesions pop up follow-up start the Valtrex.  Make a follow-up appointment with gynecology for further evaluation and treatment.              MDM   Clinical Impression: vaginal sore  Course of Treatment:   Apply mupirocin to the area.  Avoid any shaving etc.  If you note any other lesions start the Valtrex.  The culture will be sent however if there is not enough fluid it may be a false negative.  No sexual intercourse for the next several weeks until area is healed.  Follow-up with your gynecologist.      The patient is encouraged to return if any concerning symptoms arise. Additional verbal discharge instructions are given and discussed. Discharge medications are discussed. The patient is in good condition throughout the visit today and remains so upon discharge. I discuss the plan of care with the patient, who expresses understanding. All questions and concerns are addressed to the patient's satisfaction prior to discharge today.  Previous conversations with PCP and charts were reviewed.                Disposition and Plan     Clinical Impression:  1. Vaginal sore         Disposition:  Discharge  11/14/2024 12:13 pm    Follow-up:  Ophelia Chin  [address]  [facility] [address]  [phone]                Medications Prescribed:  Current Discharge Medication List        START taking these medications    Details   mupirocin 2 % External Ointment Apply 1 Application topically 3 (three) times daily for 14 days.  Qty: 1 each, Refills: 0      valACYclovir 1 G Oral Tab Take 1 tablet (1,000 mg total) by mouth every 12 (twelve) hours for 10 days.  Qty: 20 tablet, Refills: 0                 Supplementary Documentation:

## 2024-11-14 NOTE — ED INITIAL ASSESSMENT (HPI)
C/o small open wound nicolás area started 3 days ago, states possible herpes. Denies vaginal discharges. Lower abdominal pressure yesterday.

## 2024-11-19 LAB
HSV 1 NAA: NEGATIVE
HSV 1 NAA: NEGATIVE
HSV 2 NAA: POSITIVE
HSV 2 NAA: POSITIVE

## 2025-01-20 ENCOUNTER — HOSPITAL ENCOUNTER (OUTPATIENT)
Age: 25
Discharge: HOME | End: 2025-01-20
Payer: MEDICAID

## 2025-01-20 ENCOUNTER — APPOINTMENT (OUTPATIENT)
Dept: GENERAL RADIOLOGY | Age: 25
End: 2025-01-20
Attending: NURSE PRACTITIONER
Payer: MEDICAID

## 2025-01-20 VITALS
OXYGEN SATURATION: 98 % | RESPIRATION RATE: 18 BRPM | BODY MASS INDEX: 29.23 KG/M2 | DIASTOLIC BLOOD PRESSURE: 69 MMHG | HEART RATE: 73 BPM | HEIGHT: 59 IN | WEIGHT: 145 LBS | SYSTOLIC BLOOD PRESSURE: 115 MMHG | TEMPERATURE: 99 F

## 2025-01-20 DIAGNOSIS — K59.00 CONSTIPATION, UNSPECIFIED CONSTIPATION TYPE: Primary | ICD-10-CM

## 2025-01-20 LAB
B-HCG UR QL: NEGATIVE
BILIRUB UR QL STRIP: NEGATIVE
CLARITY UR: CLEAR
COLOR UR: YELLOW
GLUCOSE UR STRIP-MCNC: NEGATIVE MG/DL
HGB UR QL STRIP: NEGATIVE
KETONES UR STRIP-MCNC: NEGATIVE MG/DL
LEUKOCYTE ESTERASE UR QL STRIP: NEGATIVE
NITRITE UR QL STRIP: NEGATIVE
PH UR STRIP: 6.5 [PH]
PROT UR STRIP-MCNC: NEGATIVE MG/DL
SP GR UR STRIP: >=1.03
UROBILINOGEN UR STRIP-ACNC: <2 MG/DL

## 2025-01-20 PROCEDURE — 87491 CHLMYD TRACH DNA AMP PROBE: CPT | Performed by: NURSE PRACTITIONER

## 2025-01-20 PROCEDURE — 99214 OFFICE O/P EST MOD 30 MIN: CPT

## 2025-01-20 PROCEDURE — 81002 URINALYSIS NONAUTO W/O SCOPE: CPT

## 2025-01-20 PROCEDURE — 81514 NFCT DS BV&VAGINITIS DNA ALG: CPT | Performed by: NURSE PRACTITIONER

## 2025-01-20 PROCEDURE — 74018 RADEX ABDOMEN 1 VIEW: CPT | Performed by: NURSE PRACTITIONER

## 2025-01-20 PROCEDURE — 87591 N.GONORRHOEAE DNA AMP PROB: CPT | Performed by: NURSE PRACTITIONER

## 2025-01-20 PROCEDURE — 81025 URINE PREGNANCY TEST: CPT

## 2025-01-20 RX ORDER — SERTRALINE HYDROCHLORIDE 25 MG/1
25 TABLET, FILM COATED ORAL DAILY
COMMUNITY

## 2025-01-20 RX ORDER — POLYETHYLENE GLYCOL 3350 17 G/17G
17 POWDER, FOR SOLUTION ORAL DAILY PRN
Qty: 12 EACH | Refills: 0 | Status: SHIPPED | OUTPATIENT
Start: 2025-01-20 | End: 2025-02-19

## 2025-01-21 LAB
BV BACTERIA DNA VAG QL NAA+PROBE: NEGATIVE
C GLABRATA DNA VAG QL NAA+PROBE: NEGATIVE
C KRUSEI DNA VAG QL NAA+PROBE: NEGATIVE
C TRACH DNA SPEC QL NAA+PROBE: NEGATIVE
CANDIDA DNA VAG QL NAA+PROBE: NEGATIVE
N GONORRHOEA DNA SPEC QL NAA+PROBE: NEGATIVE
T VAGINALIS DNA VAG QL NAA+PROBE: NEGATIVE

## 2025-01-21 NOTE — ED PROVIDER NOTES
Patient Seen in: [facility]    History     Chief Complaint   Patient presents with    Urinary Symptoms     UTI symptoms (dysuria, burning when urinating, cloudy urine) - Entered by patient     Stated Complaint: Urinary Symptoms - UTI symptoms (dysuria, burning when urinating, cloudy urine)    HPI    Patient is a 24-year-old female who is here today with complaints of dysuria and vaginal irritation that started about 3 to 4 days ago.  She reports that a little over 2 weeks ago she had a D&C, she had not been sexually active since, she reports that she has just recently been sexually active for the first time in over 2 weeks.  Reports that she is unsure if this is a urinary tract infection, because of intercourse, or if it is bacterial vaginosis.  Patient denies any new sexual partners.  She is sexually active with 1 partner, denies any vaginal discharge, fevers, chills.  No past medical history on file.    Past Surgical History:   Procedure Laterality Date    Salpingectomy              No family history on file.    Social History     Socioeconomic History    Marital status: Single   Tobacco Use    Smoking status: Never    Smokeless tobacco: Never   Vaping Use    Vaping status: Never Used   Substance and Sexual Activity    Alcohol use: Yes     Comment: social    Drug use: Never     Social Drivers of Health     Financial Resource Strain: Not at Risk (1/2/2025)    Received from [email or web address]    Financial Resource Strain     Financial Resource Strain: 1   Food Insecurity: Low Risk  (1/5/2025)    Received from [facility]     Within the past 12 months, the food you bought just didn't last and you didn't have money to get more.: 3     Within the past 12 months, you worried that your food would run out before you got money to buy more.: 3   Transportation Needs: Not At Risk (1/5/2025)    Received from [facility] Transportation Needs     In the past 12 months, has lack of reliable  transportation kept you from medical appointments, meetings, work or from getting things needed for daily living?: No   Physical Activity: Not on File (10/7/2022)    Received from [facility]    Physical Activity     Physical Activity: 0   Stress: Not at Risk (1/2/2025)    Received from [employer]    Stress     Stress: 1   Social Connections: Not at Risk (1/2/2025)    Received from [employer]    Social Connections     Connectedness: 1   Housing Stability: Not At Risk (1/5/2025)    Received from [facility]     What is your living situation today?: I have a steady place to live     Think about the place you live. Do you have problems with any of the following?: None of the above       Review of Systems    Positive for stated complaint: Urinary Symptoms - UTI symptoms (dysuria, burning when urinating, cloudy urine)  Other systems are as noted in HPI.  Constitutional and vital signs reviewed.      All other systems reviewed and negative except as noted above.    PSFH elements reviewed from today and agreed except as otherwise stated in HPI.    Physical Exam     ED Triage Vitals [01/20/25 1540]   /69   Pulse 73   Resp 18   Temp 99.4 °F (37.4 °C)   Temp src Oral   SpO2 98 %   O2 Device None (Room air)       Current:/69   Pulse 73   Temp 99.4 °F (37.4 °C) (Oral)   Resp 18   Ht 149.9 cm (4' 11\")   Wt 65.8 kg   LMP 11/20/2024 (Exact Date)   SpO2 98%   BMI 29.29 kg/m²   Adult physical exam:     VS: Vital signs reviewed. O2 saturation within normal limits for this patient     General: Patient is awake and alert, oriented to person, place and time. Not in acute distress.      HEENT: Head is normocephalic atraumatic. Pupils reactive bilaterally.  EOMs intact.  No facial droop or slurred speech.  No oral pallor. Mucous membranes moist.      Neck: No cervical lymphadenopathy. No stridor. Supple. No meningsmus.      Heart: S1-S2.  Regular rate and rhythm.       Lungs: good inspiratory effort. +air entry  bilaterally without wheezes, rhonchi, crackles.  No accessory muscle use or tachypnea.       Abdomen: Soft, nontender, nondistended.  Active bowel sounds present.       Back: No CVA tenderness.     Extremities: No edema.  Pulses 2+ extremities.   Brisk capillary refill noted.      Skin: Normal skin turgor     CNS: Moves all 4 extremities.  Interacts appropriately.  No tremor.  No gait abnormality    Differential diagnosis: UTI, Gregorio, kidney stone        ED Course     Labs Reviewed   POCT PREGNANCY URINE - Normal   [facility] POCT URINALYSIS DIPSTICK   CHLAMYDIA/GONOCOCCUS, STEPHEN   VAGINITIS VAGINOSIS PCR PANEL           I have personally  reviewed available prior medical records for any recent pertinent discharge summaries/testing. Patient/family updated on results and plan, a verbalized understanding and agreement with the plan.  I explained to the patient that emergent conditions may arise and to go to the ER for new, worsening or any persistent conditions. I've explained the importance of taking all medicatons as prescribed, follow up, and return precuations,  All questions answered.  MDM   Patient is here today with complaints of dysuria, odor, vaginal irritation that started 3 days ago.  She denies any vaginal discharge, no new environmental changes.  No new sexual partners or concerns for sexually transmitted infections.  Patient reports that she also has not been having bowel movements regularly.  An x-ray was completed and showed  XR ABDOMEN (1 VIEW) (CPT=74018)    Result Date: 1/20/2025  CONCLUSION:  Unremarkable KUB.   LOCATION:  [facility]   Dictated by (CST): Cassius Mariscal MD on 1/20/2025 at 5:37 PM     Finalized by (CST): Cassius Mariscal MD on 1/20/2025 at 5:38 PM        I did discuss taking MiraLAX daily, following up with primary care physician, vaginal swab sent and pending.  Patient discharged home in stable condition to follow-up and to return to the emergency department with any worsening symptoms or  concern    Disposition and Plan     Clinical Impression:  1. Constipation, unspecified constipation type        Disposition:  Discharge    Follow-up:  No follow-up provider specified.    Medications Prescribed:  Discharge Medication List as of 1/20/2025  4:34 PM        START taking these medications    Details   polyethylene glycol, PEG 3350, 17 g Oral Powd Pack Take 17 g by mouth daily as needed., Normal, Disp-12 each, R-0

## 2025-01-28 ENCOUNTER — HOSPITAL ENCOUNTER (OUTPATIENT)
Age: 25
Discharge: HOME | End: 2025-01-28
Payer: MEDICAID

## 2025-01-28 VITALS
RESPIRATION RATE: 18 BRPM | OXYGEN SATURATION: 98 % | SYSTOLIC BLOOD PRESSURE: 112 MMHG | DIASTOLIC BLOOD PRESSURE: 73 MMHG | HEART RATE: 68 BPM | TEMPERATURE: 98 F

## 2025-01-28 DIAGNOSIS — N39.0 URINARY TRACT INFECTION WITHOUT HEMATURIA, SITE UNSPECIFIED: Primary | ICD-10-CM

## 2025-01-28 LAB
BILIRUB UR QL STRIP: NEGATIVE
CLARITY UR: CLEAR
COLOR UR: YELLOW
GLUCOSE UR STRIP-MCNC: NEGATIVE MG/DL
HGB UR QL STRIP: NEGATIVE
KETONES UR STRIP-MCNC: NEGATIVE MG/DL
LEUKOCYTE ESTERASE UR QL STRIP: NEGATIVE
NITRITE UR QL STRIP: POSITIVE
PH UR STRIP: 7 [PH]
PROT UR STRIP-MCNC: NEGATIVE MG/DL
SP GR UR STRIP: 1.01
UROBILINOGEN UR STRIP-ACNC: <2 MG/DL

## 2025-01-28 PROCEDURE — 87186 SC STD MICRODIL/AGAR DIL: CPT | Performed by: NURSE PRACTITIONER

## 2025-01-28 PROCEDURE — 87088 URINE BACTERIA CULTURE: CPT | Performed by: NURSE PRACTITIONER

## 2025-01-28 PROCEDURE — 99214 OFFICE O/P EST MOD 30 MIN: CPT

## 2025-01-28 PROCEDURE — 87086 URINE CULTURE/COLONY COUNT: CPT | Performed by: NURSE PRACTITIONER

## 2025-01-28 PROCEDURE — 81002 URINALYSIS NONAUTO W/O SCOPE: CPT

## 2025-01-28 RX ORDER — CEPHALEXIN 500 MG/1
500 CAPSULE ORAL 2 TIMES DAILY
Qty: 14 CAPSULE | Refills: 0 | Status: SHIPPED | OUTPATIENT
Start: 2025-01-28 | End: 2025-02-04

## 2025-01-28 NOTE — ED PROVIDER NOTES
Patient Seen in: [facility]    History     Chief Complaint   Patient presents with    Urinary Symptoms     Came a week ago(test results all Neg) Symptoms became progressively worst. (dysuria, foul smell, discharge) - Entered by patient     Stated Complaint: Urinary Symptoms - Came a week ago(test results all Neg)     HPI    24yr old female returns today for her third visit with c/o dysuria, frequency and now foul smelling urine.  Her symptoms started after a D&D and was Salpingectomy.    At that time she had an indwelling catheter in place.  Since the procedure he has been having burning and discomfort, with negative urine.  patient denies fever, chills, nausea, vomiting. Patient denies abdominal pain, back pain, flank pain. Patient is eating drinking well without difficulty. Patient denies any hematuria. Patient is sexually active with one current partner.  No Concerns for STIs at this time.   History reviewed. No pertinent past medical history.    Past Surgical History:   Procedure Laterality Date    Salpingectomy              No family history on file.    Social History     Socioeconomic History    Marital status: Single   Tobacco Use    Smoking status: Never    Smokeless tobacco: Never   Vaping Use    Vaping status: Never Used   Substance and Sexual Activity    Alcohol use: Yes     Comment: social    Drug use: Never     Social Drivers of Health     Financial Resource Strain: Not at Risk (1/2/2025)    Received from [employer]    Financial Resource Strain     Financial Resource Strain: 1   Food Insecurity: Low Risk  (1/5/2025)    Received from [facility]     Within the past 12 months, the food you bought just didn't last and you didn't have money to get more.: 3     Within the past 12 months, you worried that your food would run out before you got money to buy more.: 3   Transportation Needs: Not At Risk (1/5/2025)    Received from [facility] Needs     In  the past 12 months, has lack of reliable transportation kept you from medical appointments, meetings, work or from getting things needed for daily living?: No   Physical Activity: Not on File (10/7/2022)    Received from [facility]    Physical Activity     Physical Activity: 0   Stress: Not at Risk (1/2/2025)    Received from [employer]    Stress     Stress: 1   Social Connections: Not at Risk (1/2/2025)    Received from [employer]    Social Connections     Connectedness: 1   Housing Stability: Not At Risk (1/5/2025)    Received from [facility]     What is your living situation today?: I have a steady place to live     Think about the place you live. Do you have problems with any of the following?: None of the above       Review of Systems    Positive for stated complaint: Urinary Symptoms - Came a week ago(test results all Neg)   Other systems are as noted in HPI.  Constitutional and vital signs reviewed.      All other systems reviewed and negative except as noted above.    PSFH elements reviewed from today and agreed except as otherwise stated in HPI.    Physical Exam     ED Triage Vitals [01/28/25 1432]   /73   Pulse 68   Resp 18   Temp 98.1 °F (36.7 °C)   Temp src Oral   SpO2 98 %   O2 Device None (Room air)       Current:/73   Pulse 68   Temp 98.1 °F (36.7 °C) (Oral)   Resp 18   LMP 11/20/2024 (Exact Date)   SpO2 98%   Adult physical exam:     VS: Vital signs reviewed. O2 saturation within normal limits for this patient     General: Patient is awake and alert, oriented to person, place and time. Not in acute distress.      HEENT: Head is normocephalic atraumatic. Pupils reactive bilaterally.  EOMs intact.        Lungs: no respiratory distress noted    Abdomen: Soft, nontender, nondistended.  No suprapubic pain.     Back: No CVA tenderness.     Extremities: No edema.  Pulses 2+ extremities.   Brisk capillary refill noted.      Skin: Normal skin turgor     CNS: Moves all 4 extremities.   Interacts appropriately.  No tremor.  No gait abnormality    Differential diagnosis: UTI, Gregorio, kidney stone        ED Course     Labs Reviewed   [facility] POCT URINALYSIS DIPSTICK - Abnormal; Notable for the following components:       Result Value    Nitrite Urine Positive (*)     All other components within normal limits           I have personally  reviewed available prior medical records for any recent pertinent discharge summaries/testing. Patient/family updated on results and plan, a verbalized understanding and agreement with the plan.  I explained to the patient that emergent conditions may arise and to go to the ER for new, worsening or any persistent conditions. I've explained the importance of taking all medicatons as prescribed, follow up, and return precuations,  All questions answered.  MDM   Patient presents to the emergency room for dysuria.  History and physical exam as above.  Mild suprapubic tenderness without other abdominal tenderness or flank pain.  Afebrile, nontoxic and does not meet SIRS criteria.  UA consistent with UTI.  Denies vaginal discharge or new sexual contacts, no concerns for STDs.  Will treat with antibiotics  Recommend follow-up with PCP.  Counseled on hygiene and preventative measures.  Return to ED precautions discussed with the patient who verbalized understanding.        Disposition and Plan     Clinical Impression:  1. Urinary tract infection without hematuria, site unspecified        Disposition:  Discharge    Follow-up:  No follow-up provider specified.    Medications Prescribed:  Current Discharge Medication List        START taking these medications    Details   cephALEXin 500 MG Oral Cap Take 1 capsule (500 mg total) by mouth 2 (two) times daily for 7 days.  Qty: 14 capsule, Refills: 0

## 2025-01-30 RX ORDER — CIPROFLOXACIN 500 MG/1
500 TABLET, FILM COATED ORAL 2 TIMES DAILY
Qty: 14 TABLET | Refills: 0 | Status: SHIPPED | OUTPATIENT
Start: 2025-01-30 | End: 2025-02-06

## 2025-02-23 ENCOUNTER — HOSPITAL ENCOUNTER (OUTPATIENT)
Age: 25
Discharge: HOME | End: 2025-02-23
Attending: EMERGENCY MEDICINE
Payer: MEDICAID

## 2025-02-23 VITALS
SYSTOLIC BLOOD PRESSURE: 120 MMHG | OXYGEN SATURATION: 98 % | BODY MASS INDEX: 29.23 KG/M2 | TEMPERATURE: 99 F | HEIGHT: 59 IN | WEIGHT: 145 LBS | HEART RATE: 91 BPM | DIASTOLIC BLOOD PRESSURE: 68 MMHG | RESPIRATION RATE: 18 BRPM

## 2025-02-23 DIAGNOSIS — B97.89 VIRAL SINUSITIS: Primary | ICD-10-CM

## 2025-02-23 DIAGNOSIS — J32.9 VIRAL SINUSITIS: Primary | ICD-10-CM

## 2025-02-23 PROCEDURE — 99212 OFFICE O/P EST SF 10 MIN: CPT

## (undated) NOTE — LETTER
Date & Time: 10/9/2024, 12:18 PM  Patient: Neisha Barron  Encounter Provider(s):    Tita Joiner MD       To Whom It May Concern:    Neisha Barron was seen and treated in our department on 10/9/2024. She can return to school on 10/10/24.    If you have any questions or concerns, please do not hesitate to call.        _____________________________  Physician/APC Signature

## (undated) NOTE — LETTER
Date & Time: 12/4/2023, 6:53 PM  Patient: Mynor Crook  Encounter Provider(s):    RADHA Horton       To Whom It May Concern:    Mynor Crook was seen and treated in our department on 12/4/2023. She should not return to work until 12/06/2023 . If you have any questions or concerns, please do not hesitate to call.         Daniela Adams NP-C  Nurse Practitioner    This note has been electronically signed